# Patient Record
Sex: FEMALE | Race: WHITE | Employment: UNEMPLOYED | URBAN - METROPOLITAN AREA
[De-identification: names, ages, dates, MRNs, and addresses within clinical notes are randomized per-mention and may not be internally consistent; named-entity substitution may affect disease eponyms.]

---

## 2021-03-23 ENCOUNTER — OFFICE VISIT (OUTPATIENT)
Dept: PRIMARY CARE CLINIC | Age: 56
End: 2021-03-23
Payer: COMMERCIAL

## 2021-03-23 VITALS
BODY MASS INDEX: 22.13 KG/M2 | SYSTOLIC BLOOD PRESSURE: 130 MMHG | DIASTOLIC BLOOD PRESSURE: 76 MMHG | HEART RATE: 69 BPM | RESPIRATION RATE: 20 BRPM | HEIGHT: 68 IN | OXYGEN SATURATION: 98 % | WEIGHT: 146 LBS

## 2021-03-23 DIAGNOSIS — J45.909 MILD ASTHMA WITHOUT COMPLICATION, UNSPECIFIED WHETHER PERSISTENT: ICD-10-CM

## 2021-03-23 DIAGNOSIS — A60.00 GENITAL HERPES SIMPLEX, UNSPECIFIED SITE: ICD-10-CM

## 2021-03-23 DIAGNOSIS — M32.9 LUPUS (HCC): Primary | ICD-10-CM

## 2021-03-23 DIAGNOSIS — R10.9 ABDOMINAL PAIN, UNSPECIFIED ABDOMINAL LOCATION: ICD-10-CM

## 2021-03-23 PROCEDURE — G8427 DOCREV CUR MEDS BY ELIG CLIN: HCPCS | Performed by: FAMILY MEDICINE

## 2021-03-23 PROCEDURE — 99203 OFFICE O/P NEW LOW 30 MIN: CPT | Performed by: FAMILY MEDICINE

## 2021-03-23 PROCEDURE — G8484 FLU IMMUNIZE NO ADMIN: HCPCS | Performed by: FAMILY MEDICINE

## 2021-03-23 PROCEDURE — 1036F TOBACCO NON-USER: CPT | Performed by: FAMILY MEDICINE

## 2021-03-23 PROCEDURE — G8420 CALC BMI NORM PARAMETERS: HCPCS | Performed by: FAMILY MEDICINE

## 2021-03-23 PROCEDURE — 3017F COLORECTAL CA SCREEN DOC REV: CPT | Performed by: FAMILY MEDICINE

## 2021-03-23 RX ORDER — GLYCERIN/MIN OIL/POLYCARBOPHIL
GEL WITH APPLICATOR (GRAM) VAGINAL
COMMUNITY
End: 2022-03-22 | Stop reason: SDUPTHER

## 2021-03-23 RX ORDER — ALBUTEROL SULFATE 90 UG/1
2 AEROSOL, METERED RESPIRATORY (INHALATION) EVERY 6 HOURS PRN
Qty: 1 INHALER | Refills: 1 | Status: SHIPPED | OUTPATIENT
Start: 2021-03-23

## 2021-03-23 RX ORDER — AZATHIOPRINE 50 MG/1
TABLET ORAL
COMMUNITY
Start: 2021-03-18

## 2021-03-23 RX ORDER — HYDROXYCHLOROQUINE SULFATE 200 MG/1
200 TABLET, FILM COATED ORAL 2 TIMES DAILY
COMMUNITY
Start: 2021-03-18

## 2021-03-23 SDOH — HEALTH STABILITY: MENTAL HEALTH: HOW OFTEN DO YOU HAVE A DRINK CONTAINING ALCOHOL?: MONTHLY OR LESS

## 2021-03-23 ASSESSMENT — ENCOUNTER SYMPTOMS
SORE THROAT: 0
VOMITING: 0
CONSTIPATION: 1
COUGH: 0
BLOOD IN STOOL: 0
SHORTNESS OF BREATH: 0
DIARRHEA: 1
NAUSEA: 0
ABDOMINAL PAIN: 1
WHEEZING: 1

## 2021-03-23 ASSESSMENT — PATIENT HEALTH QUESTIONNAIRE - PHQ9
SUM OF ALL RESPONSES TO PHQ QUESTIONS 1-9: 0
SUM OF ALL RESPONSES TO PHQ QUESTIONS 1-9: 0
2. FEELING DOWN, DEPRESSED OR HOPELESS: 0
SUM OF ALL RESPONSES TO PHQ QUESTIONS 1-9: 0

## 2021-03-23 NOTE — PATIENT INSTRUCTIONS
Patient Education        Lupus: Care Instructions  Overview  Lupus (systemic lupus erythematosus) is a long-term disease that can cause inflammation, pain, and tissue damage in your body. It is an autoimmune disease. This means the immune system attacks its own tissues. Lupus may cause problems with your skin, kidneys, heart, lungs, nerves, or blood cells. There are other types of lupus, but systemic lupus erythematosus is the most common and most serious type. When you have lupus symptoms, you are having flares or relapses. When your symptoms get better, you are in remission. Lupus may get worse very quickly. There is no way to tell when a flare will happen or how bad it will be. When you have a lupus flare, you may have new symptoms as well as symptoms you have had in the past.  Learn your body's signs of a flare, such as joint pain, a rash, a fever, or being more tired. When you see any of these signs, take steps to control your symptoms. Follow-up care is a key part of your treatment and safety. Be sure to make and go to all appointments, and call your doctor if you are having problems. It's also a good idea to know your test results and keep a list of the medicines you take. How can you care for yourself at home? Reduce stress and tiredness  · Keep your daily schedule as simple as possible. · Keep your list of things to do as short as you can. · Exercise regularly. A daily walk or swim, for example, can lower stress, clear your head, improve your mood, and help fight tiredness. · Use meditation, yoga, or guided imagery to relax. · Get plenty of rest. Some people with lupus need up to 12 hours of sleep every night. · Pace yourself. Do not do too many activities. · Ask others for help. Do not try to do everything yourself. · Take short breaks from your usual activities. Think about cutting down on work hours when your symptoms are severe.   · If you think that depression or anxiety is making you feel more tired, talk to your doctor, a mental health professional, or both. Take care of your skin  · Ask your doctor about the use of corticosteroid creams for skin symptoms. · If you are bothered by the way a lupus rash looks on your face or if you have scars from lupus, you can try makeup, such as Covermark, to cover the rash or scars. · Stay out of the sun, especially when the sun's rays are the strongest, usually between 10 a.m. and 4 p.m. If you must be in the sun, cover your arms and legs, and wear a hat. Make sure to use a broad-spectrum sunscreen that has a sun protection factor (SPF) of 50 or higher. Put more sunscreen on after swimming, sweating, or toweling off. Practice good self-care  · Learn more about lupus and how to take care of yourself. · Take your medicines exactly as prescribed. Call your doctor if you have any problems with your medicine. · Do not smoke. If you need help quitting, talk to your doctor about stop-smoking programs and medicines. These can increase your chances of quitting for good. · Eat a healthy, balanced diet. A balanced diet includes whole grains, dairy, fruits and vegetables, and protein. Eat a variety of foods from each of those groups so you get all the nutrients you need. · Avoid other people who are sick with colds or the flu. These illnesses can cause lupus flares. Talk with your doctor about any vaccines you may need, including flu shots and pneumococcal vaccines. If you do get sick or think you are getting an infection, talk with your doctor so you can treat your symptoms right away. · Brush and floss your teeth each day. See your dentist two times a year. · Get regular eye exams. · Build a support system of family, friends, and health professionals. When should you call for help? Call 911 anytime you think you may need emergency care. For example, call if:    · You have symptoms of a heart attack. These may include:  ?  Chest pain or pressure, or a strange feeling in the chest.  ? Sweating. ? Shortness of breath. ? Nausea or vomiting. ? Pain, pressure, or a strange feeling in the back, neck, jaw, or upper belly or in one or both shoulders or arms. ? Lightheadedness or sudden weakness. ? A fast or irregular heartbeat. After you call 911, the  may tell you to chew 1 adult-strength or 2 to 4 low-dose aspirin. Wait for an ambulance. Do not try to drive yourself. Call your doctor now or seek immediate medical care if:    · You are short of breath.     · You have blood in your urine or are urinating less often and in smaller amounts than usual.     · You have a fever.     · You feel depressed or notice any changes in your behavior or thinking.     · You are dizzy or have muscle weakness.     · You have swelling of the lower legs or feet. Watch closely for changes in your health, and be sure to contact your doctor if:    · Your symptoms get worse or you develop any new symptoms. These may include aching or swollen joints, increased fatigue, loss of appetite, hair loss, skin rashes, or new sores in your mouth or nose. Where can you learn more? Go to https://Sovicell.Mesosphere. org and sign in to your Toutpost account. Enter S741 in the [employer] box to learn more about \"Lupus: Care Instructions. \"     If you do not have an account, please click on the \"Sign Up Now\" link. Current as of: August 5, 2020               Content Version: 12.8  © 2006-2021 Healthwise, Incorporated. Care instructions adapted under license by [facility] ([facility]). If you have questions about a medical condition or this instruction, always ask your healthcare professional. [place] [address] any warranty or liability for your use of this information.

## 2021-03-23 NOTE — PROGRESS NOTES
Chief Complaint   Patient presents with    New Patient     NP to establish         Kelly Duran is a 60 y.o. female who presents to be established. Pt recently returned to the [facility] from [facility]    Patient has a past medical history significant for lupus and is followed by Rheumatology and is on imuran and plaquenil medications since 2013 with good control. Patient had recent labwork [CMP, lipid panel, CBC, ESR, UA] 03/18/2021 which was WNL except for an elevated .3    Pt has a hx of asthma and uses albuterol inhaler as needed; Pt has never had a screening  colonoscopy    Pt does have a hx of genital herpes with flareups at times    Pt is an exsmoker Quit 2006 and drinks alcohol once a month    FHx negative for diabetes or CAD; FHx positive for Rheumatoid arthritis (MGF)        Rheumatology note 03/18/2021  Follow-up on systemic lupus [1998,thrombocytopenia, joint pain, Raynaud's, pleurisy. initially on prednisone, PlaquenilX1 yr, Imuran 2005- and Plaquenil 2013-].  Last seen 4 months ago. on a regimen of Imuran 100 mg a day, Plaquenil 400 mg daily, multivitamin. Compliant with medications. Last eye exam for Plaquenil use was in October 2020 and no abnormality noted. Denies rash or sicca symptoms or mucosal sores or chronic cough or shortness of breath or abdominal pain. Denies joint swelling or joint pain other than neck pain for about 10 days that has resolved on its own. Had Raynaud's symptoms more often this winter but no skin breakdown. Follows protective measures. Notes random occurrence of abdominal cramps and diarrhea, better with use of probiotic. Notes symptoms for 4 to 5 years now. Also reports this random occurrence of burning sensation to her skin on the extremities after taking a shower and lasting for 20 to 40 minutes.  Going on for years and cannot figure out any relationship to skin care products or foods   Diagnoses and all orders for this visit:    Other systemic lupus erythematosus times) and diarrhea (at times). Negative for blood in stool, nausea and vomiting. Negative melena or indigestion   Endocrine: Negative for polydipsia and polyuria. Genitourinary: Negative for dysuria and hematuria. Musculoskeletal: Negative for arthralgias. Skin: Negative for rash. Neurological: Negative for dizziness, seizures, syncope, speech difficulty, weakness and headaches. Psychiatric/Behavioral: Negative for dysphoric mood. The patient is not nervous/anxious. Vitals:    03/23/21 0754   BP: 130/76   Pulse: 69   Resp: 20   SpO2: 98%         Physical Exam  Vitals signs reviewed. Constitutional:       General: She is not in acute distress. HENT:      Mouth/Throat:      Mouth: Mucous membranes are moist.      Pharynx: Oropharynx is clear. Eyes:      General: No scleral icterus. Comments: Pink conjunctivae    Neck:      Thyroid: No thyromegaly. Comments: No carotid bruits noted B/L  Cardiovascular:      Heart sounds: Normal heart sounds. No murmur. No friction rub. No gallop. Pulmonary:      Effort: Pulmonary effort is normal.      Breath sounds: Normal breath sounds. Abdominal:      Palpations: Abdomen is soft. Tenderness: There is no abdominal tenderness. Musculoskeletal:      Comments: No leg edema noted B/L   Lymphadenopathy:      Cervical: No cervical adenopathy. Skin:     Findings: No rash. Neurological:      Mental Status: She is alert. Comments: Cranial nerves 2 - 12 grossly intact; Muscle strength 5/5 throughout   Psychiatric:         Mood and Affect: Mood normal.         ASSESSMENT AND PLAN    1. Lupus ([address]  Pt is being managed by Rheumatology and is clinically stable on imuran and plaquenil medications    2. Abdominal pain, unspecified abdominal location  Pt reports associated episodes of diarrhea and constipation and may be secondary to irritable bowel syndrome but will Refer pt to GI for further evaluation with a colonoscopy.   Pt is with a nontender abdomen on PE  - [facility] - Janae Perez MD, Gastroenterology, [address] [place] [address]     3. Genital herpes simplex, unspecified site  Will Refer pt to OB/Gyne for evaluation  - Damaris Gamboa MD, Gynecology, [place]    4. Mild asthma without complication, unspecified whether persistent  Patient clinically stable on present medications and is with clear lung fields on PE  - albuterol sulfate HFA (VENTOLIN HFA) 108 (90 Base) MCG/ACT inhaler; Inhale 2 puffs into the lungs every 6 hours as needed for Wheezing or Shortness of Breath  Dispense: 1 Inhaler; Refill: 1        Gabriele Chapin MD      Return in about 4 months (around 7/23/2021). Patient Instructions       Patient Education        Lupus: Care Instructions  Overview  Lupus (systemic lupus erythematosus) is a long-term disease that can cause inflammation, pain, and tissue damage in your body. It is an autoimmune disease. This means the immune system attacks its own tissues. Lupus may cause problems with your skin, kidneys, heart, lungs, nerves, or blood cells. There are other types of lupus, but systemic lupus erythematosus is the most common and most serious type. When you have lupus symptoms, you are having flares or relapses. When your symptoms get better, you are in remission. Lupus may get worse very quickly. There is no way to tell when a flare will happen or how bad it will be. When you have a lupus flare, you may have new symptoms as well as symptoms you have had in the past.  Learn your body's signs of a flare, such as joint pain, a rash, a fever, or being more tired. When you see any of these signs, take steps to control your symptoms. Follow-up care is a key part of your treatment and safety. Be sure to make and go to all appointments, and call your doctor if you are having problems. It's also a good idea to know your test results and keep a list of the medicines you take. How can you care for yourself at home?   Reduce stress and tiredness  · Keep your daily schedule as simple as possible. · Keep your list of things to do as short as you can. · Exercise regularly. A daily walk or swim, for example, can lower stress, clear your head, improve your mood, and help fight tiredness. · Use meditation, yoga, or guided imagery to relax. · Get plenty of rest. Some people with lupus need up to 12 hours of sleep every night. · Pace yourself. Do not do too many activities. · Ask others for help. Do not try to do everything yourself. · Take short breaks from your usual activities. Think about cutting down on work hours when your symptoms are severe. · If you think that depression or anxiety is making you feel more tired, talk to your doctor, a mental health professional, or both. Take care of your skin  · Ask your doctor about the use of corticosteroid creams for skin symptoms. · If you are bothered by the way a lupus rash looks on your face or if you have scars from lupus, you can try makeup, such as Covermark, to cover the rash or scars. · Stay out of the sun, especially when the sun's rays are the strongest, usually between 10 a.m. and 4 p.m. If you must be in the sun, cover your arms and legs, and wear a hat. Make sure to use a broad-spectrum sunscreen that has a sun protection factor (SPF) of 50 or higher. Put more sunscreen on after swimming, sweating, or toweling off. Practice good self-care  · Learn more about lupus and how to take care of yourself. · Take your medicines exactly as prescribed. Call your doctor if you have any problems with your medicine. · Do not smoke. If you need help quitting, talk to your doctor about stop-smoking programs and medicines. These can increase your chances of quitting for good. · Eat a healthy, balanced diet. A balanced diet includes whole grains, dairy, fruits and vegetables, and protein. Eat a variety of foods from each of those groups so you get all the nutrients you need.   · Avoid other people who are sick with colds or the flu. These illnesses can cause lupus flares. Talk with your doctor about any vaccines you may need, including flu shots and pneumococcal vaccines. If you do get sick or think you are getting an infection, talk with your doctor so you can treat your symptoms right away. · Brush and floss your teeth each day. See your dentist two times a year. · Get regular eye exams. · Build a support system of family, friends, and health professionals. When should you call for help? Call 911 anytime you think you may need emergency care. For example, call if:    · You have symptoms of a heart attack. These may include:  ? Chest pain or pressure, or a strange feeling in the chest.  ? Sweating. ? Shortness of breath. ? Nausea or vomiting. ? Pain, pressure, or a strange feeling in the back, neck, jaw, or upper belly or in one or both shoulders or arms. ? Lightheadedness or sudden weakness. ? A fast or irregular heartbeat. After you call 911, the  may tell you to chew 1 adult-strength or 2 to 4 low-dose aspirin. Wait for an ambulance. Do not try to drive yourself. Call your doctor now or seek immediate medical care if:    · You are short of breath.     · You have blood in your urine or are urinating less often and in smaller amounts than usual.     · You have a fever.     · You feel depressed or notice any changes in your behavior or thinking.     · You are dizzy or have muscle weakness.     · You have swelling of the lower legs or feet. Watch closely for changes in your health, and be sure to contact your doctor if:    · Your symptoms get worse or you develop any new symptoms. These may include aching or swollen joints, increased fatigue, loss of appetite, hair loss, skin rashes, or new sores in your mouth or nose. Where can you learn more? Go to https://benny.SoCAT. org and sign in to your TestFreakst account.  Enter L238 in the [facility] box to learn more about \"Lupus: Care Instructions. \"     If you do not have an account, please click on the \"Sign Up Now\" link. Current as of: August 5, 2020               Content Version: 12.8  © 2006-2021 Healthwise, Incorporated. Care instructions adapted under license by [facility] ([facility]). If you have questions about a medical condition or this instruction, always ask your healthcare professional. [address] any warranty or liability for your use of this information.

## 2021-05-13 ENCOUNTER — OFFICE VISIT (OUTPATIENT)
Dept: GYNECOLOGY | Age: 56
End: 2021-05-13
Payer: COMMERCIAL

## 2021-05-13 VITALS
HEIGHT: 68 IN | HEART RATE: 63 BPM | BODY MASS INDEX: 22.28 KG/M2 | OXYGEN SATURATION: 98 % | DIASTOLIC BLOOD PRESSURE: 76 MMHG | RESPIRATION RATE: 16 BRPM | SYSTOLIC BLOOD PRESSURE: 138 MMHG | WEIGHT: 147 LBS

## 2021-05-13 DIAGNOSIS — N89.8 VAGINAL DRYNESS: ICD-10-CM

## 2021-05-13 DIAGNOSIS — Z01.419 WELL WOMAN EXAM WITH ROUTINE GYNECOLOGICAL EXAM: Primary | ICD-10-CM

## 2021-05-13 DIAGNOSIS — Z12.31 ENCOUNTER FOR SCREENING MAMMOGRAM FOR MALIGNANT NEOPLASM OF BREAST: ICD-10-CM

## 2021-05-13 PROCEDURE — 99386 PREV VISIT NEW AGE 40-64: CPT | Performed by: OBSTETRICS & GYNECOLOGY

## 2021-05-13 RX ORDER — ESTRADIOL 0.1 MG/G
CREAM VAGINAL
Qty: 1 TUBE | Refills: 3 | Status: SHIPPED | OUTPATIENT
Start: 2021-05-13

## 2021-05-13 ASSESSMENT — ENCOUNTER SYMPTOMS
CONSTIPATION: 0
VOMITING: 0
ABDOMINAL DISTENTION: 0
ANAL BLEEDING: 0
SORE THROAT: 0
PHOTOPHOBIA: 0
COUGH: 0
RECTAL PAIN: 0
COLOR CHANGE: 0
BACK PAIN: 0
ABDOMINAL PAIN: 0
BLOOD IN STOOL: 0
APNEA: 0
TROUBLE SWALLOWING: 0
NAUSEA: 0
WHEEZING: 0
DIARRHEA: 0
CHEST TIGHTNESS: 0
SHORTNESS OF BREATH: 0

## 2021-05-13 NOTE — PROGRESS NOTES
Annual GYN Visit    Cinthia Ramírez  Date ofBirth:  1965    Date of Service:  5/13/2021    Chief Complaint:   Cinthia Ramírez is a 60 y.o. Werner Hernandez female who presents for routine annual gynecologic visit and vaginal dryness    HPI:  Patient is postmenopausal- LMP- over 7 years ago, she is here for routine annual exam, has not had a pap smear since 2008. Denies postmenopausal bleeding. She has severe vaginal dryness and dyspareunia secondary to dryness, patient is . Has been using otc lubricants for many years - replens     Health Maintenance   Topic Date Due    Hepatitis C screen  Never done    COVID-19 Vaccine (1) Never done    HIV screen  Never done    Cervical cancer screen  Never done    Lipid screen  Never done    Breast cancer screen  Never done    Shingles Vaccine (1 of 2) Never done    Colon cancer screen colonoscopy  Never done    DTaP/Tdap/Td vaccine (2 - Td) 01/07/2018    Flu vaccine (Season Ended) 09/01/2021    Hepatitis A vaccine  Aged Out    Hepatitis B vaccine  Aged Out    Hib vaccine  Aged Out    Meningococcal (ACWY) vaccine  Aged Out    Pneumococcal 0-64 years Vaccine  Aged Out       Past Medical History:   Diagnosis Date    Asthma     Herpes simplex type 2 infection     [address]us ([facility] [address]      Past Surgical History:   Procedure Laterality Date    KNEE ARTHROSCOPY Right 1981     OB History   No obstetric history on file.      Social History     Socioeconomic History    Marital status:      Spouse name: Not on file    Number of children: Not on file    Years of education: Not on file    Highest education level: Not on file   Occupational History    Not on file   Social Needs    Financial resource strain: Not hard at all    Food insecurity     Worry: Never true     Inability: Never true   [employer] needs     Medical: No     Non-medical: No   Tobacco Use    Smoking status: Former Smoker     Packs/day: 1.00     Years: 20.00     Pack years: 20.00     Types: Cigarettes     Quit date: 1/1/2006     Years since quitting: 15.3    Smokeless tobacco: Former User     Types: Snuff     Quit date: 1/1/2007   Substance and Sexual Activity    Alcohol use: Yes     Frequency: Monthly or less    Drug use: Never    Sexual activity: Not on file   Lifestyle    Physical activity     Days per week: Not on file     Minutes per session: Not on file    Stress: Not on file   Relationships    Social connections     Talks on phone: Not on file     Gets together: Not on file     Attends [employer] service: Not on file     Active member of club or organization: Not on file     Attends meetings of clubs or organizations: Not on file     Relationship status: Not on file    Intimate partner violence     Fear of current or ex partner: Not on file     Emotionally abused: Not on file     Physically abused: Not on file     Forced sexual activity: Not on file   Other Topics Concern    Not on file   Social History Narrative    Not on file     Allergies   Allergen Reactions    Other Itching    Sulfa Antibiotics     Penicillins Rash     Outpatient Medications Marked as Taking for the 5/13/21 encounter (Office Visit) with Karolina Walter MD   Medication Sig Dispense Refill    estradiol (ESTRACE VAGINAL) 0.1 MG/GM vaginal cream 1 gram per vagina qhs x 1 month then twice weekly 1 Tube 3    azaTHIOprine (IMURAN) 50 MG tablet 1 pill twice a day      hydroxychloroquine (PLAQUENIL) 200 MG tablet Take 200 mg by mouth 2 times daily      Probiotic Product (PROBIOTIC DAILY PO) Take by mouth      Vaginal Lubricant (REPLENS) GEL Place vaginally      albuterol sulfate HFA (VENTOLIN HFA) 108 (90 Base) MCG/ACT inhaler Inhale 2 puffs into the lungs every 6 hours as needed for Wheezing or Shortness of Breath 1 Inhaler 1     Family History   Problem Relation Age of Onset    No Known Problems Mother     Heart Disease Father         also vascular disease    Asthma Sister     Other Brother         GERD  Ovarian Cancer Paternal Aunt 61         Review of Systems:  Review of Systems   Constitutional: Negative for appetite change, chills, fatigue, fever and unexpected weight change. HENT: Negative for ear pain, hearing loss, mouth sores, sore throat and trouble swallowing. Eyes: Negative for photophobia and visual disturbance. Respiratory: Negative for apnea, cough, chest tightness, shortness of breath and wheezing. Cardiovascular: Negative for chest pain, palpitations and leg swelling. Gastrointestinal: Negative for abdominal distention, abdominal pain, anal bleeding, blood in stool, constipation, diarrhea, nausea, rectal pain and vomiting. Endocrine: Negative for cold intolerance, heat intolerance, polydipsia, polyphagia and polyuria. Genitourinary: Positive for dyspareunia (from vaginal dyrness). Negative for difficulty urinating, dysuria, enuresis, frequency, genital sores, hematuria, menstrual problem, pelvic pain, urgency, vaginal bleeding, vaginal discharge and vaginal pain. Musculoskeletal: Negative for arthralgias, back pain, joint swelling and myalgias. Skin: Negative for color change and rash. Neurological: Negative for dizziness, seizures, syncope, light-headedness and headaches. Psychiatric/Behavioral: Negative for agitation, behavioral problems, confusion, decreased concentration, dysphoric mood, hallucinations, self-injury, sleep disturbance and suicidal ideas. The patient is not nervous/anxious and is not hyperactive. Physical Exam:  /76 (Site: Right Upper Arm, Position: Sitting, Cuff Size: Medium Adult)   Pulse 63   Resp 16   Ht 5' 8\" (1.727 m)   Wt 147 lb (66.7 kg)   SpO2 98%   Breastfeeding No   BMI 22.35 kg/m²   BP Readings from Last 3 Encounters:   05/13/21 138/76   03/23/21 130/76     Body mass index is 22.35 kg/m². Physical Exam  Constitutional:       General: She is not in acute distress. Appearance: She is well-developed.    HENT:      Head: Normocephalic and atraumatic. Mouth/Throat:      Pharynx: No oropharyngeal exudate. Eyes:      General: No scleral icterus. Right eye: No discharge. Left eye: No discharge. Conjunctiva/sclera: Conjunctivae normal.   Neck:      Thyroid: No thyromegaly. Cardiovascular:      Rate and Rhythm: Normal rate and regular rhythm. Heart sounds: Normal heart sounds. Pulmonary:      Effort: Pulmonary effort is normal.      Breath sounds: Normal breath sounds. Abdominal:      General: Bowel sounds are normal. There is no distension. Palpations: Abdomen is soft. There is no mass. Tenderness: There is no abdominal tenderness. There is no guarding or rebound. Genitourinary:     Labia:         Right: No rash, tenderness, lesion or injury. Left: No rash, tenderness, lesion or injury. Vagina: Normal. No signs of injury and foreign body. No vaginal discharge, erythema or tenderness. Cervix: No cervical motion tenderness, discharge or friability. Uterus: Not deviated, not enlarged, not fixed and not tender. Adnexa:         Right: No mass, tenderness or fullness. Left: No mass, tenderness or fullness. Rectum: No mass or external hemorrhoid. Comments: Vaginal mucosa severe atrophic changes   Skin:     General: Skin is warm. Coloration: Skin is not pale. Findings: No erythema or rash. Neurological:      Mental Status: She is alert. Psychiatric:         Behavior: Behavior normal. Behavior is cooperative. Thought Content: Thought content normal.         Judgment: Judgment normal.           Assessment/Plan:  1. Well woman exam with routine gynecological exam  - PAP SMEAR  Self breast exam discussed and encouraged  Diet and exercise discussed  Discussed daily multi-vitamin and adequate calcium and vitamin D in diet    2.  Encounter for screening mammogram for malignant neoplasm of breast  - [facility] DIGITAL SCREEN W OR WO CAD

## 2021-06-18 ENCOUNTER — HOSPITAL ENCOUNTER (OUTPATIENT)
Dept: WOMENS IMAGING | Age: 56
Discharge: HOME | End: 2021-06-18
Payer: COMMERCIAL

## 2021-06-18 VITALS — WEIGHT: 144 LBS | HEIGHT: 68 IN | BODY MASS INDEX: 21.82 KG/M2

## 2021-06-18 DIAGNOSIS — Z12.31 ENCOUNTER FOR SCREENING MAMMOGRAM FOR MALIGNANT NEOPLASM OF BREAST: ICD-10-CM

## 2021-06-18 PROCEDURE — 77067 SCR MAMMO BI INCL CAD: CPT

## 2021-07-26 ENCOUNTER — OFFICE VISIT (OUTPATIENT)
Dept: PRIMARY CARE CLINIC | Age: 56
End: 2021-07-26
Payer: COMMERCIAL

## 2021-07-26 VITALS
DIASTOLIC BLOOD PRESSURE: 78 MMHG | WEIGHT: 146 LBS | HEART RATE: 74 BPM | RESPIRATION RATE: 16 BRPM | TEMPERATURE: 99.4 F | SYSTOLIC BLOOD PRESSURE: 120 MMHG | OXYGEN SATURATION: 98 % | HEIGHT: 68 IN | BODY MASS INDEX: 22.13 KG/M2

## 2021-07-26 DIAGNOSIS — M32.9 LUPUS (HCC): Primary | ICD-10-CM

## 2021-07-26 DIAGNOSIS — K58.2 IRRITABLE BOWEL SYNDROME WITH BOTH CONSTIPATION AND DIARRHEA: ICD-10-CM

## 2021-07-26 DIAGNOSIS — J45.909 MILD ASTHMA WITHOUT COMPLICATION, UNSPECIFIED WHETHER PERSISTENT: ICD-10-CM

## 2021-07-26 PROCEDURE — G8420 CALC BMI NORM PARAMETERS: HCPCS | Performed by: FAMILY MEDICINE

## 2021-07-26 PROCEDURE — G8427 DOCREV CUR MEDS BY ELIG CLIN: HCPCS | Performed by: FAMILY MEDICINE

## 2021-07-26 PROCEDURE — 3017F COLORECTAL CA SCREEN DOC REV: CPT | Performed by: FAMILY MEDICINE

## 2021-07-26 PROCEDURE — 99214 OFFICE O/P EST MOD 30 MIN: CPT | Performed by: FAMILY MEDICINE

## 2021-07-26 PROCEDURE — 1036F TOBACCO NON-USER: CPT | Performed by: FAMILY MEDICINE

## 2021-07-26 ASSESSMENT — ENCOUNTER SYMPTOMS
SHORTNESS OF BREATH: 0
NAUSEA: 0
DIARRHEA: 1
WHEEZING: 1
VOMITING: 0
CONSTIPATION: 1
COUGH: 0
ABDOMINAL PAIN: 1
BLOOD IN STOOL: 0
SORE THROAT: 0

## 2021-07-26 NOTE — PATIENT INSTRUCTIONS
Patient Education        Diet for Irritable Bowel Syndrome: Care Instructions  Your Care Instructions     Irritable bowel syndrome, or IBS, is a problem with the intestines. IBS can cause belly pain, bloating, gas, constipation, and diarrhea. Most people can control their symptoms by changing their diet and easing stress. No specific foods cause everyone with IBS to have symptoms. Many people find that they feel better by limiting or eliminating foods that may bring on symptoms. Make sure you don't stop eating all foods from any one food group without talking with a dietitian. You need to make sure you are still getting all the nutrients you need. Follow-up care is a key part of your treatment and safety. Be sure to make and go to all appointments, and call your doctor if you are having problems. It's also a good idea to know your test results and keep a list of the medicines you take. How can you care for yourself at home? To reduce constipation  · Include fruits, vegetables, beans, and whole grains in your diet each day. These foods are high in fiber. Slowly increase the amount of fiber you eat. This helps you avoid a lot of gas. · Drink plenty of fluids. If you have kidney, heart, or liver disease and have to limit fluids, talk with your doctor before you increase the amount of fluids you drink. · Get some exercise every day. Build up slowly to 30 to 60 minutes a day on 5 or more days of the week. · Take a fiber supplement, such as Citrucel or Metamucil, every day if needed. Read and follow all instructions on the label. · Schedule time each day for a bowel movement. Having a daily routine may help. Take your time and do not strain when having a bowel movement. · Check with your doctor before you increase the amount of fiber in your diet. For some people who have IBS, eating more fiber may make some symptoms worse. This includes bloating.   To reduce diarrhea  You may try giving up foods or drinks one at a time to see whether symptoms improve. Limit or avoid the following:  · Alcohol  · Caffeine, which is found in coffee, tea, cola drinks, and chocolate  · Nicotine, from smoking or chewing tobacco  · Gas-producing foods, such as beans, broccoli, cabbage, and apples  · Dairy products that contain lactose (milk sugar), such as ice cream and milk. · Foods and drinks high in sugar, especially fruit juice, soda, candy, and other packaged sweets (such as cookies)  · Foods high in fat, including villalpando, sausage, butter, oils, and anything deep-fried  · Sorbitol and xylitol, artificial sweeteners found in some sugarless candies and chewing gum  Keep track of foods  · Some people with IBS use a daily food diary to keep track of what they eat and whether they have any symptoms after eating certain foods. The diary also can be a good way to record what is going on in your life. · Stress plays a role in IBS. So if you are aware that certain stresses bring on symptoms, you can try to reduce those stresses. Keep mealtimes pleasant  · Try to maintain a pleasant environment when you eat. This may reduce stress that can make symptoms likely to occur. · Give yourself plenty of time to eat, rather than eating on the go. Chew your food slowly. Try not to swallow air, which can cause bloating. Where can you learn more? Go to https://RingadocpepicOurStory.Dropost.it. org and sign in to your Neogrowth account. Enter B029 in the [facility] box to learn more about \"Diet for Irritable Bowel Syndrome: Care Instructions. \"     If you do not have an account, please click on the \"Sign Up Now\" link. Current as of: December 17, 2020               Content Version: 12.9  © 7190-6729 Healthwise, Incorporated. Care instructions adapted under license by [facility] ([facility]).  If you have questions about a medical condition or this instruction, always ask your healthcare professional. Antoine Olivera disclaims any warranty or liability for your use of this information.

## 2021-07-26 NOTE — PROGRESS NOTES
Chief Complaint   Patient presents with    Other     follow up on chronic condition, pt has no new concerns/complaints         Bety Matute is a 60 y.o. female who presents for followup visit regarding lupus, asthma and abdominal pain  Pt did see GI as directed and reports a normal colonoscopy and was Dx with irritable bowel syndrome    Pt did see OB/Gyne as directed and had a normal PAP smear and screening mammogram      Patient has a past medical history significant for lupus and is followed by Rheumatology and is on imuran and plaquenil medications since 2013 with good control. Patient had recent labwork [CMP, lipid panel, CBC, ESR, UA] 03/18/2021 which was WNL except for an elevated . 3     Pt has a hx of asthma and uses albuterol inhaler as needed    Pt did complete her COVID vaccine series    Pt does have a hx of genital herpes with flareups at times     Pt is an exsmoker Quit 2006 and drinks alcohol once a month     FHx negative for diabetes or CAD; FHx positive for Rheumatoid arthritis (MGF)      Review of Systems   Constitutional: Positive for fatigue (mild). Negative for fever. HENT: Negative for nosebleeds and sore throat. Eyes:        Negative blurred vision or diplopia   Respiratory: Positive for wheezing (at times). Negative for cough and shortness of breath. Cardiovascular: Negative for chest pain, palpitations and leg swelling. Gastrointestinal: Positive for abdominal pain (crampy at times), constipation (at times) and diarrhea (at times). Negative for blood in stool, nausea and vomiting. Negative melena or indigestion   Endocrine: Positive for polydipsia. Negative for polyuria. Genitourinary: Negative for dysuria and hematuria. Musculoskeletal: Negative for arthralgias. Skin: Positive for rash (from lupus at times). Neurological: Negative for dizziness, seizures, syncope, speech difficulty, weakness and headaches.    Psychiatric/Behavioral: Negative for dysphoric mood. The patient is not nervous/anxious. Vitals:    07/26/21 1529   BP: 120/78   Pulse: 74   Resp: 16   Temp: 99.4 °F (37.4 °C)   SpO2: 98%         Physical Exam  Vitals reviewed. Constitutional:       General: She is not in acute distress. HENT:      Mouth/Throat:      Mouth: Mucous membranes are moist.      Pharynx: Oropharynx is clear. Eyes:      General: No scleral icterus. Comments: Pink conjunctivae    Neck:      Thyroid: No thyromegaly. Cardiovascular:      Heart sounds: Normal heart sounds. No murmur heard. No friction rub. No gallop. Pulmonary:      Effort: Pulmonary effort is normal.      Breath sounds: Normal breath sounds. Abdominal:      Palpations: Abdomen is soft. Tenderness: There is no abdominal tenderness. Musculoskeletal:      Comments: No leg edema noted B/L   Lymphadenopathy:      Cervical: No cervical adenopathy. Skin:     Findings: No rash. Neurological:      Mental Status: She is alert. Comments: Cranial nerves 2 - 12 grossly intact; Muscle strength 5/5 throughout   Psychiatric:         Mood and Affect: Mood normal.         ASSESSMENT AND PLAN    1. Lupus ([address]  Pt is managed by Rheumatology and is clinically stable on present medications    2. Mild asthma without complication, unspecified whether persistent  Patient clinically stable on present medications and is with clear lung fields on PE    3. Irritable bowel syndrome with both constipation and diarrhea  Pt is with a nontender abdomen on PE and need to obtain recent colonoscopy report for review        Mina Best MD      Return in about 6 months (around 1/26/2022). Patient Instructions     Patient Education        Diet for Irritable Bowel Syndrome: Care Instructions  Your Care Instructions     Irritable bowel syndrome, or IBS, is a problem with the intestines. IBS can cause belly pain, bloating, gas, constipation, and diarrhea.  Most people can control their symptoms by changing their diet and easing stress. No specific foods cause everyone with IBS to have symptoms. Many people find that they feel better by limiting or eliminating foods that may bring on symptoms. Make sure you don't stop eating all foods from any one food group without talking with a dietitian. You need to make sure you are still getting all the nutrients you need. Follow-up care is a key part of your treatment and safety. Be sure to make and go to all appointments, and call your doctor if you are having problems. It's also a good idea to know your test results and keep a list of the medicines you take. How can you care for yourself at home? To reduce constipation  · Include fruits, vegetables, beans, and whole grains in your diet each day. These foods are high in fiber. Slowly increase the amount of fiber you eat. This helps you avoid a lot of gas. · Drink plenty of fluids. If you have kidney, heart, or liver disease and have to limit fluids, talk with your doctor before you increase the amount of fluids you drink. · Get some exercise every day. Build up slowly to 30 to 60 minutes a day on 5 or more days of the week. · Take a fiber supplement, such as Citrucel or Metamucil, every day if needed. Read and follow all instructions on the label. · Schedule time each day for a bowel movement. Having a daily routine may help. Take your time and do not strain when having a bowel movement. · Check with your doctor before you increase the amount of fiber in your diet. For some people who have IBS, eating more fiber may make some symptoms worse. This includes bloating. To reduce diarrhea  You may try giving up foods or drinks one at a time to see whether symptoms improve.  Limit or avoid the following:  · Alcohol  · Caffeine, which is found in coffee, tea, cola drinks, and chocolate  · Nicotine, from smoking or chewing tobacco  · Gas-producing foods, such as beans, broccoli, cabbage, and apples  · Dairy products that contain lactose (milk sugar), such as ice cream and milk. · Foods and drinks high in sugar, especially fruit juice, soda, candy, and other packaged sweets (such as cookies)  · Foods high in fat, including villalpando, sausage, butter, oils, and anything deep-fried  · Sorbitol and xylitol, artificial sweeteners found in some sugarless candies and chewing gum  Keep track of foods  · Some people with IBS use a daily food diary to keep track of what they eat and whether they have any symptoms after eating certain foods. The diary also can be a good way to record what is going on in your life. · Stress plays a role in IBS. So if you are aware that certain stresses bring on symptoms, you can try to reduce those stresses. Keep mealtimes pleasant  · Try to maintain a pleasant environment when you eat. This may reduce stress that can make symptoms likely to occur. · Give yourself plenty of time to eat, rather than eating on the go. Chew your food slowly. Try not to swallow air, which can cause bloating. Where can you learn more? Go to https://InPulse Medical.Useful at Night. org and sign in to your WriteOn account. Enter F408 in the [facility] box to learn more about \"Diet for Irritable Bowel Syndrome: Care Instructions. \"     If you do not have an account, please click on the \"Sign Up Now\" link. Current as of: December 17, 2020               Content Version: 12.9  © 6782-8452 [employer], [facility]. Care instructions adapted under license by [facility] ([facility]). If you have questions about a medical condition or this instruction, always ask your healthcare professional. [place] [address] any warranty or liability for your use of this information.

## 2022-03-22 DIAGNOSIS — N89.8 DRYNESS OF VAGINA: Primary | ICD-10-CM

## 2022-03-22 RX ORDER — GLYCERIN/MIN OIL/POLYCARBOPHIL
0.1 GEL WITH APPLICATOR (GRAM) VAGINAL DAILY
Qty: 6.7 G | Refills: 0 | Status: SHIPPED | OUTPATIENT
Start: 2022-03-22

## 2022-03-22 NOTE — TELEPHONE ENCOUNTER
Medication:   Requested Prescriptions     Pending Prescriptions Disp Refills    Vaginal Lubricant (REPLENS) GEL  0     Sig: Place 0.1 mg vaginally        Last Filled:      Patient Phone Number: [phone] (home)     Last appt: 7/26/2021   Next appt: 4/7/2022    Last OARRS: No flowsheet data found.

## 2022-03-22 NOTE — TELEPHONE ENCOUNTER
----- Message from José Miguel Rolon sent at 3/22/2022 11:08 AM EDT -----  Subject: Refill Request    QUESTIONS  Name of Medication? estradiol (ESTRACE VAGINAL) 0.1 MG/GM vaginal cream  Patient-reported dosage and instructions? Applied 1 Gram of the cream   twice completely  How many days do you have left? 0  Preferred Pharmacy? [address] phone number (if available)? [phone]  Additional Information for Provider? Pt was seeing a [facility] ob/gyn   that prescribes this name listed in the chart. She has refills but that   provider isn't with us anymore. Can you sign off on this refill while she   awaits being referred to another ob/gyn on her next appt 4/7/22?  ---------------------------------------------------------------------------  --------------  [address]  What is the best way for the office to contact you? OK to leave message on   voicemail  Preferred Call Back Phone Number?  [phone]

## 2022-04-01 NOTE — TELEPHONE ENCOUNTER
Left detailed voice mail for patient stating that Dr Kelsey Enicnas sent RX on 3/22/2022 to [facility]. No further action is required for this encounter.

## 2022-04-01 NOTE — TELEPHONE ENCOUNTER
Patient requesting a medication refill.     Medication Estradiol  Dosage 0.1MG/GM  Frequency 1 gram per vagina qhs x 1 month then twice weekly  [address] [facility] [address]   Next office visit 4/7/2022

## 2022-04-07 ENCOUNTER — OFFICE VISIT (OUTPATIENT)
Dept: PRIMARY CARE CLINIC | Age: 57
End: 2022-04-07
Payer: COMMERCIAL

## 2022-04-07 VITALS
TEMPERATURE: 97.8 F | HEART RATE: 63 BPM | BODY MASS INDEX: 23.04 KG/M2 | HEIGHT: 68 IN | OXYGEN SATURATION: 99 % | WEIGHT: 152 LBS | DIASTOLIC BLOOD PRESSURE: 80 MMHG | RESPIRATION RATE: 20 BRPM | SYSTOLIC BLOOD PRESSURE: 124 MMHG

## 2022-04-07 DIAGNOSIS — N89.8 DRYNESS OF VAGINA: ICD-10-CM

## 2022-04-07 DIAGNOSIS — K58.2 IRRITABLE BOWEL SYNDROME WITH BOTH CONSTIPATION AND DIARRHEA: ICD-10-CM

## 2022-04-07 DIAGNOSIS — Z12.83 SKIN CANCER SCREENING: ICD-10-CM

## 2022-04-07 DIAGNOSIS — M32.9 LUPUS (HCC): Primary | ICD-10-CM

## 2022-04-07 DIAGNOSIS — J45.909 MILD ASTHMA WITHOUT COMPLICATION, UNSPECIFIED WHETHER PERSISTENT: ICD-10-CM

## 2022-04-07 PROCEDURE — G8420 CALC BMI NORM PARAMETERS: HCPCS | Performed by: FAMILY MEDICINE

## 2022-04-07 PROCEDURE — 3017F COLORECTAL CA SCREEN DOC REV: CPT | Performed by: FAMILY MEDICINE

## 2022-04-07 PROCEDURE — G8427 DOCREV CUR MEDS BY ELIG CLIN: HCPCS | Performed by: FAMILY MEDICINE

## 2022-04-07 PROCEDURE — 99214 OFFICE O/P EST MOD 30 MIN: CPT | Performed by: FAMILY MEDICINE

## 2022-04-07 PROCEDURE — 1036F TOBACCO NON-USER: CPT | Performed by: FAMILY MEDICINE

## 2022-04-07 SDOH — ECONOMIC STABILITY: FOOD INSECURITY: WITHIN THE PAST 12 MONTHS, THE FOOD YOU BOUGHT JUST DIDN'T LAST AND YOU DIDN'T HAVE MONEY TO GET MORE.: NEVER TRUE

## 2022-04-07 SDOH — ECONOMIC STABILITY: FOOD INSECURITY: WITHIN THE PAST 12 MONTHS, YOU WORRIED THAT YOUR FOOD WOULD RUN OUT BEFORE YOU GOT MONEY TO BUY MORE.: NEVER TRUE

## 2022-04-07 ASSESSMENT — PATIENT HEALTH QUESTIONNAIRE - PHQ9
2. FEELING DOWN, DEPRESSED OR HOPELESS: 0
SUM OF ALL RESPONSES TO PHQ QUESTIONS 1-9: 0
SUM OF ALL RESPONSES TO PHQ9 QUESTIONS 1 & 2: 0
1. LITTLE INTEREST OR PLEASURE IN DOING THINGS: 0
SUM OF ALL RESPONSES TO PHQ QUESTIONS 1-9: 0

## 2022-04-07 ASSESSMENT — ENCOUNTER SYMPTOMS
COUGH: 0
VOMITING: 0
WHEEZING: 0
SHORTNESS OF BREATH: 0
CONSTIPATION: 0
DIARRHEA: 0
SORE THROAT: 0
BLOOD IN STOOL: 0
NAUSEA: 0
ABDOMINAL PAIN: 0

## 2022-04-07 ASSESSMENT — SOCIAL DETERMINANTS OF HEALTH (SDOH): HOW HARD IS IT FOR YOU TO PAY FOR THE VERY BASICS LIKE FOOD, HOUSING, MEDICAL CARE, AND HEATING?: NOT HARD AT ALL

## 2022-04-07 NOTE — PATIENT INSTRUCTIONS
Patient Education        Lupus: Care Instructions  Overview  Lupus (systemic lupus erythematosus) is a long-term disease that can cause inflammation, pain, and tissue damage in your body. It is an autoimmune disease. This means the immune system attacks its own tissues. Lupus may cause problems with your skin, kidneys, heart, lungs, nerves, or blood cells. There are other types of lupus, but systemic lupus erythematosus is the most commonand most serious type. When you have lupus symptoms, you are having flares or relapses. When your symptoms get better, you are in remission. Lupus may get worse very quickly. There is no way to tell when a flare will happen or how bad it will be. When you have a lupus flare, you may have new symptoms as well as symptoms you havehad in the past.  Learn your body's signs of a flare, such as joint pain, a rash, a fever, or being more tired. When you see any of these signs, take steps to control yoursymptoms. Follow-up care is a key part of your treatment and safety. Be sure to make and go to all appointments, and call your doctor if you are having problems. It's also a good idea to know your test results and keep alist of the medicines you take. How can you care for yourself at home? Reduce stress and tiredness   Keep your daily schedule as simple as possible.  Keep your list of things to do as short as you can.  Exercise regularly. A daily walk or swim, for example, can lower stress, clear your head, improve your mood, and help fight tiredness.  Use meditation, yoga, or guided imagery to relax.  Get plenty of rest. Some people with lupus need up to 12 hours of sleep every night.  Pace yourself. Do not do too many activities.  Ask others for help. Do not try to do everything yourself.  Take short breaks from your usual activities. Think about cutting down on work hours when your symptoms are severe.    If you think that depression or anxiety is making you feel more tired, talk to your doctor, a mental health professional, or both. Take care of your skin   Ask your doctor about the use of corticosteroid creams for skin symptoms.  If you are bothered by the way a lupus rash looks on your face or if you have scars from lupus, you can try makeup, such as Covermark, to cover the rash or scars.  Stay out of the sun, especially when the sun's rays are the strongest, usually between 10 a.m. and 4 p.m. If you must be in the sun, cover your arms and legs, and wear a hat. Make sure to use a broad-spectrum sunscreen that has a sun protection factor (SPF) of 50 or higher. Put more sunscreen on after swimming, sweating, or toweling off. Practice good self-care   Learn more about lupus and how to take care of yourself.  Take your medicines exactly as prescribed. Call your doctor if you have any problems with your medicine.  Do not smoke. If you need help quitting, talk to your doctor about stop-smoking programs and medicines. These can increase your chances of quitting for good.  Eat a healthy, balanced diet. A balanced diet includes whole grains, dairy, fruits and vegetables, and protein. Eat a variety of foods from each of those groups so you get all the nutrients you need.  Avoid other people who are sick with colds or the flu. These illnesses can cause lupus flares. Talk with your doctor about any vaccines you may need, including flu shots and pneumococcal vaccines. If you do get sick or think you are getting an infection, talk with your doctor so you can treat your symptoms right away.  Brush and floss your teeth each day. See your dentist two times a year.  Get regular eye exams.  Build a support system of family, friends, and health professionals. When should you call for help? Call 911 anytime you think you may need emergency care. For example, call if:     You have symptoms of a heart attack. These may include:  ?  Chest pain or pressure, or a strange feeling in the chest.  ? Sweating. ? Shortness of breath. ? Nausea or vomiting. ? Pain, pressure, or a strange feeling in the back, neck, jaw, or upper belly or in one or both shoulders or arms. ? Lightheadedness or sudden weakness. ? A fast or irregular heartbeat. After you call 911, the  may tell you to chew 1 adult-strength or 2 to 4 low-doseaspirin. Wait for an ambulance. Do not try to drive yourself. Call your doctor now or seek immediate medical care if:     You are short of breath.      You have blood in your urine or are urinating less often and in smaller amounts than usual.      You have a fever.      You feel depressed or notice any changes in your behavior or thinking.      You are dizzy or have muscle weakness.      You have swelling of the lower legs or feet. Watch closely for changes in your health, and be sure to contact your doctor if:     Your symptoms get worse or you develop any new symptoms. These may include aching or swollen joints, increased fatigue, loss of appetite, hair loss, skin rashes, or new sores in your mouth or nose. Where can you learn more? Go to https://eyetok.Nanushka. org and sign in to your Seer account. Enter J713 in the [employer] box to learn more about \"Lupus: Care Instructions. \"     If you do not have an account, please click on the \"Sign Up Now\" link. Current as of: December 20, 2021               Content Version: 13.2  © 2006-2022 Healthwise, Incorporated. Care instructions adapted under license by [facility] ([facility]). If you have questions about a medical condition or this instruction, always ask your healthcare professional. [place] [address] any warranty or liability for your use of this information.

## 2022-04-07 NOTE — PROGRESS NOTES
Chief Complaint   Patient presents with    Follow-up     pt here for follow up visit for chronic condition         Stevan Mejia is a 60 y.o. female who presents for routine visit regarding lupus, asthma and irritable bowel syndrome  Pt had a normal colonoscopy last year and is on a daily fiber supplement     Pt uses estrogen cream for vaginal dryness and had a normal PAP smear and screening mammogram last year     Patient has a past medical history significant for lupus and is followed by Rheumatology and is on imuran and plaquenil medications since 2013 with good control. Pt reports a normal eye exam 10/2021 but her plaquenil dosage was decreased at that time to be safe and pt has had only one flareup 2 weeks ago in which she had acute L wrist pain. Patient had CBC, CMP and ESR bloodwork yesterday which was WNL     Pt has a hx of asthma and uses albuterol inhaler as needed     Pt did complete her COVID vaccine series     Pt does have a hx of genital herpes with flareups at times     Pt is an exsmoker Quit 2006 and drinks alcohol once a month     FHx negative for diabetes or CAD; FHx positive for Rheumatoid arthritis (MGF)      Rheumatology note 04/06/2022  Follow-up on systemic lupus [1998,thrombocytopenia, joint pain, Raynaud's, pleurisy. initially on prednisone, PlaquenilX1 yr, Imuran 2005- and Plaquenil 2013-].  on a regimen of Imuran 100 mg a day, Plaquenil 300 mg daily [dose decreased on last visit to keep it below 5 mg/kg] multivitamin. Last seen 4 months ago.   History-  She is doing well and has not had any significant issues with the decrease in hydroxychloroquine dose. She had one episode of severe pain at the left wrist and difficulty using her left hand that lasted 2 days. The intensity of pain decreased overnight but symptoms lingered for 2 days. She also felt some tightness in her chest at that time like her pleurisy was flaring but it resolved overnight.   She is frequently having excessive tearing from both eyes and then gritty feeling in the eyes. Her right seems to be worse. No dryness to mouth. No fevers or rash or cough or shortness of breath or abdominal pain. No joint symptoms   Other systemic lupus erythematosus with other organ involvement ([address]  Stable other than the sign of dry eyes. Blood work to monitor. Asked her to continue current dose of hydroxychloroquine and azathioprine  - CBC WITH DIFFERENTIAL; Future  - CMP (BAMP,TP,ALB,TBIL,ALK,AST,ALT); Future  - COMPLEMENT C4; Future  - COMPLEMENT C3; Future  - VENIPUNC,VENOUS BLOOD  - SED RATE; Future    Excessive tearing, bilateral  precursor of dry eyes. Advised her to use artificial tears twice a day every day. Vaccine counseling  Given immunocompromise from medication, advised a second Covid vaccine booster if it has been 3 months since the first booster    Other orders  - hydroxychloroquine (PLAQUENIL) 200 mg tablet; 1 pill in morning and 1/2 pill in evening every day  - azaTHIOprine (IMURAN) 50 MG TABS; Take 1 tablet by mouth 2 (two) times daily. Recommended a follow-up in 4 months. Review of Systems   Constitutional: Positive for fatigue (mild). Negative for fever. HENT: Negative for nosebleeds and sore throat. Eyes:        Negative blurred vision or diplopia   Respiratory: Negative for cough, shortness of breath and wheezing. Cardiovascular: Negative for chest pain, palpitations and leg swelling. Gastrointestinal: Negative for abdominal pain, blood in stool, constipation, diarrhea, nausea and vomiting. Negative melena or indigestion   Endocrine: Negative for polydipsia and polyuria. Genitourinary: Negative for dysuria and hematuria. Musculoskeletal: Positive for arthralgias (L wrist 2 weeks ago). Skin: Positive for rash (from lupus at times). Neurological: Positive for headaches (at times). Negative for dizziness, seizures, syncope, speech difficulty and weakness.    Psychiatric/Behavioral: Negative for dysphoric mood. The patient is not nervous/anxious. Vitals:    04/07/22 0904   BP: 124/80   Pulse: 63   Resp: 20   Temp: 97.8 °F (36.6 °C)   SpO2: 99%         Physical Exam  Vitals reviewed. Constitutional:       General: She is not in acute distress. HENT:      Mouth/Throat:      Mouth: Mucous membranes are moist.      Pharynx: Oropharynx is clear. Eyes:      General: No scleral icterus. Comments: Pink conjunctivae    Neck:      Thyroid: No thyromegaly. Cardiovascular:      Heart sounds: Normal heart sounds. No murmur heard. No friction rub. No gallop. Pulmonary:      Effort: Pulmonary effort is normal.      Breath sounds: Normal breath sounds. Abdominal:      Palpations: Abdomen is soft. Tenderness: There is no abdominal tenderness. Musculoskeletal:      Comments: No leg edema noted B/L   Lymphadenopathy:      Cervical: No cervical adenopathy. Skin:     Findings: No rash. Comments: Positive fair complexion with freckling    Neurological:      Mental Status: She is alert. Comments: Cranial nerves 2 - 12 grossly intact; Muscle strength 5/5 throughout   Psychiatric:         Mood and Affect: Mood normal.         ASSESSMENT AND PLAN    1. Mild asthma without complication, unspecified whether persistent  Patient clinically stable on present medications and is with clear lung fields on PE    2. Lupus ([address]  Pt is managed by Rheumatology and is clinically stable on present medications and had recent normal CMP and CBC bloodwork    3. Irritable bowel syndrome with both constipation and diarrhea  Pt clinically stable on a daily fiber supplement and is with a nontender abdomen on PE    4. Dryness of vagina  Will Refer patient to OB/Gyne for continued management  - [facility] - Arie Wagner MD, Gynecology, [facility]    5.  Skin cancer screening  - Shawn Spears MD, Dermatology, [facility] MD [facility]      Return in about 6 months (around 10/7/2022). Patient Instructions       Patient Education        Lupus: Care Instructions  Overview  Lupus (systemic lupus erythematosus) is a long-term disease that can cause inflammation, pain, and tissue damage in your body. It is an autoimmune disease. This means the immune system attacks its own tissues. Lupus may cause problems with your skin, kidneys, heart, lungs, nerves, or blood cells. There are other types of lupus, but systemic lupus erythematosus is the most commonand most serious type. When you have lupus symptoms, you are having flares or relapses. When your symptoms get better, you are in remission. Lupus may get worse very quickly. There is no way to tell when a flare will happen or how bad it will be. When you have a lupus flare, you may have new symptoms as well as symptoms you havehad in the past.  Learn your body's signs of a flare, such as joint pain, a rash, a fever, or being more tired. When you see any of these signs, take steps to control yoursymptoms. Follow-up care is a key part of your treatment and safety. Be sure to make and go to all appointments, and call your doctor if you are having problems. It's also a good idea to know your test results and keep alist of the medicines you take. How can you care for yourself at home? Reduce stress and tiredness   Keep your daily schedule as simple as possible.  Keep your list of things to do as short as you can.  Exercise regularly. A daily walk or swim, for example, can lower stress, clear your head, improve your mood, and help fight tiredness.  Use meditation, yoga, or guided imagery to relax.  Get plenty of rest. Some people with lupus need up to 12 hours of sleep every night.  Pace yourself. Do not do too many activities.  Ask others for help. Do not try to do everything yourself.  Take short breaks from your usual activities. Think about cutting down on work hours when your symptoms are severe.    If you think that depression or anxiety is making you feel more tired, talk to your doctor, a mental health professional, or both. Take care of your skin   Ask your doctor about the use of corticosteroid creams for skin symptoms.  If you are bothered by the way a lupus rash looks on your face or if you have scars from lupus, you can try makeup, such as Covermark, to cover the rash or scars.  Stay out of the sun, especially when the sun's rays are the strongest, usually between 10 a.m. and 4 p.m. If you must be in the sun, cover your arms and legs, and wear a hat. Make sure to use a broad-spectrum sunscreen that has a sun protection factor (SPF) of 50 or higher. Put more sunscreen on after swimming, sweating, or toweling off. Practice good self-care   Learn more about lupus and how to take care of yourself.  Take your medicines exactly as prescribed. Call your doctor if you have any problems with your medicine.  Do not smoke. If you need help quitting, talk to your doctor about stop-smoking programs and medicines. These can increase your chances of quitting for good.  Eat a healthy, balanced diet. A balanced diet includes whole grains, dairy, fruits and vegetables, and protein. Eat a variety of foods from each of those groups so you get all the nutrients you need.  Avoid other people who are sick with colds or the flu. These illnesses can cause lupus flares. Talk with your doctor about any vaccines you may need, including flu shots and pneumococcal vaccines. If you do get sick or think you are getting an infection, talk with your doctor so you can treat your symptoms right away.  Brush and floss your teeth each day. See your dentist two times a year.  Get regular eye exams.  Build a support system of family, friends, and health professionals. When should you call for help? Call 911 anytime you think you may need emergency care. For example, call if:     You have symptoms of a heart attack.  These may include:  ? Chest pain or pressure, or a strange feeling in the chest.  ? Sweating. ? Shortness of breath. ? Nausea or vomiting. ? Pain, pressure, or a strange feeling in the back, neck, jaw, or upper belly or in one or both shoulders or arms. ? Lightheadedness or sudden weakness. ? A fast or irregular heartbeat. After you call 911, the  may tell you to chew 1 adult-strength or 2 to 4 low-doseaspirin. Wait for an ambulance. Do not try to drive yourself. Call your doctor now or seek immediate medical care if:     You are short of breath.      You have blood in your urine or are urinating less often and in smaller amounts than usual.      You have a fever.      You feel depressed or notice any changes in your behavior or thinking.      You are dizzy or have muscle weakness.      You have swelling of the lower legs or feet. Watch closely for changes in your health, and be sure to contact your doctor if:     Your symptoms get worse or you develop any new symptoms. These may include aching or swollen joints, increased fatigue, loss of appetite, hair loss, skin rashes, or new sores in your mouth or nose. Where can you learn more? Go to https://[employer]. org and sign in to your [employer] account. Enter N465 in the [employer] box to learn more about \"Lupus: Care Instructions. \"     If you do not have an account, please click on the \"Sign Up Now\" link. Current as of: December 20, 2021               Content Version: 13.2  © 2006-2022 [employer]wise, Incorporated. Care instructions adapted under license by [facility] ([facility]). If you have questions about a medical condition or this instruction, always ask your healthcare professional. [place] [address] any warranty or liability for your use of this information.

## 2022-07-15 ENCOUNTER — HOSPITAL ENCOUNTER (OUTPATIENT)
Dept: WOMENS IMAGING | Age: 57
Discharge: HOME | End: 2022-07-15
Payer: COMMERCIAL

## 2022-07-15 DIAGNOSIS — Z12.31 VISIT FOR SCREENING MAMMOGRAM: ICD-10-CM

## 2022-07-15 PROCEDURE — 77067 SCR MAMMO BI INCL CAD: CPT

## 2022-10-06 ENCOUNTER — APPOINTMENT (RX ONLY)
Dept: URBAN - METROPOLITAN AREA CLINIC 170 | Facility: CLINIC | Age: 57
Setting detail: DERMATOLOGY
End: 2022-10-06

## 2022-10-06 DIAGNOSIS — D22 MELANOCYTIC NEVI: ICD-10-CM | Status: STABLE

## 2022-10-06 DIAGNOSIS — L81.4 OTHER MELANIN HYPERPIGMENTATION: ICD-10-CM | Status: STABLE

## 2022-10-06 DIAGNOSIS — L85.3 XEROSIS CUTIS: ICD-10-CM | Status: STABLE

## 2022-10-06 DIAGNOSIS — L82.1 OTHER SEBORRHEIC KERATOSIS: ICD-10-CM | Status: STABLE

## 2022-10-06 DIAGNOSIS — D18.0 HEMANGIOMA: ICD-10-CM | Status: STABLE

## 2022-10-06 PROBLEM — D18.01 HEMANGIOMA OF SKIN AND SUBCUTANEOUS TISSUE: Status: ACTIVE | Noted: 2022-10-06

## 2022-10-06 PROBLEM — D22.5 MELANOCYTIC NEVI OF TRUNK: Status: ACTIVE | Noted: 2022-10-06

## 2022-10-06 PROCEDURE — ? FULL BODY SKIN EXAM

## 2022-10-06 PROCEDURE — 99203 OFFICE O/P NEW LOW 30 MIN: CPT

## 2022-10-06 PROCEDURE — ? ADDITIONAL NOTES

## 2022-10-06 PROCEDURE — ? COUNSELING

## 2022-10-06 PROCEDURE — ? TREATMENT REGIMEN

## 2022-10-06 ASSESSMENT — LOCATION DETAILED DESCRIPTION DERM
LOCATION DETAILED: LEFT SUPERIOR MEDIAL MIDBACK
LOCATION DETAILED: PERIUMBILICAL SKIN
LOCATION DETAILED: LEFT ANTERIOR DISTAL THIGH
LOCATION DETAILED: EPIGASTRIC SKIN
LOCATION DETAILED: RIGHT DISTAL POSTERIOR UPPER ARM
LOCATION DETAILED: LEFT PROXIMAL POSTERIOR UPPER ARM
LOCATION DETAILED: RIGHT MID-UPPER BACK

## 2022-10-06 ASSESSMENT — LOCATION ZONE DERM
LOCATION ZONE: ARM
LOCATION ZONE: LEG
LOCATION ZONE: TRUNK

## 2022-10-06 ASSESSMENT — LOCATION SIMPLE DESCRIPTION DERM
LOCATION SIMPLE: LEFT THIGH
LOCATION SIMPLE: ABDOMEN
LOCATION SIMPLE: LEFT POSTERIOR UPPER ARM
LOCATION SIMPLE: RIGHT UPPER BACK
LOCATION SIMPLE: LEFT LOWER BACK
LOCATION SIMPLE: RIGHT POSTERIOR UPPER ARM

## 2022-10-06 NOTE — PROCEDURE: ADDITIONAL NOTES
Detail Level: Simple
Additional Notes: Patient consent was obtained to proceed with the visit and recommended plan of care after discussion of all risks and benefits, including the risks of COVID-19 exposure.
Render Risk Assessment In Note?: yes
Additional Notes: Samples of eucerin
Detail Level: Detailed
Render Risk Assessment In Note?: no

## 2023-10-27 ENCOUNTER — HOSPITAL ENCOUNTER (OUTPATIENT)
Dept: WOMENS IMAGING | Age: 58
Discharge: HOME | End: 2023-10-27
Payer: MEDICARE

## 2023-10-27 VITALS — HEIGHT: 68 IN | WEIGHT: 147 LBS | BODY MASS INDEX: 22.28 KG/M2

## 2023-10-27 DIAGNOSIS — Z12.31 VISIT FOR SCREENING MAMMOGRAM: ICD-10-CM

## 2023-10-27 PROCEDURE — 77067 SCR MAMMO BI INCL CAD: CPT

## 2024-08-21 ENCOUNTER — OFFICE VISIT (OUTPATIENT)
Dept: PRIMARY CARE CLINIC | Age: 59
End: 2024-08-21
Payer: COMMERCIAL

## 2024-08-21 VITALS
TEMPERATURE: 97.9 F | SYSTOLIC BLOOD PRESSURE: 110 MMHG | HEART RATE: 55 BPM | DIASTOLIC BLOOD PRESSURE: 70 MMHG | HEIGHT: 67 IN | BODY MASS INDEX: 22.6 KG/M2 | OXYGEN SATURATION: 97 % | WEIGHT: 144 LBS

## 2024-08-21 DIAGNOSIS — M32.9 LUPUS (HCC): ICD-10-CM

## 2024-08-21 DIAGNOSIS — J45.20 MILD INTERMITTENT ASTHMA WITHOUT COMPLICATION: ICD-10-CM

## 2024-08-21 DIAGNOSIS — Z00.00 INITIAL MEDICARE ANNUAL WELLNESS VISIT: Primary | ICD-10-CM

## 2024-08-21 PROCEDURE — G0438 PPPS, INITIAL VISIT: HCPCS | Performed by: FAMILY MEDICINE

## 2024-08-21 SDOH — ECONOMIC STABILITY: FOOD INSECURITY: WITHIN THE PAST 12 MONTHS, YOU WORRIED THAT YOUR FOOD WOULD RUN OUT BEFORE YOU GOT MONEY TO BUY MORE.: NEVER TRUE

## 2024-08-21 SDOH — ECONOMIC STABILITY: FOOD INSECURITY: WITHIN THE PAST 12 MONTHS, THE FOOD YOU BOUGHT JUST DIDN'T LAST AND YOU DIDN'T HAVE MONEY TO GET MORE.: NEVER TRUE

## 2024-08-21 SDOH — ECONOMIC STABILITY: INCOME INSECURITY: HOW HARD IS IT FOR YOU TO PAY FOR THE VERY BASICS LIKE FOOD, HOUSING, MEDICAL CARE, AND HEATING?: NOT HARD AT ALL

## 2024-08-21 ASSESSMENT — PATIENT HEALTH QUESTIONNAIRE - PHQ9
SUM OF ALL RESPONSES TO PHQ QUESTIONS 1-9: 0
SUM OF ALL RESPONSES TO PHQ9 QUESTIONS 1 & 2: 0
SUM OF ALL RESPONSES TO PHQ QUESTIONS 1-9: 0
1. LITTLE INTEREST OR PLEASURE IN DOING THINGS: NOT AT ALL
SUM OF ALL RESPONSES TO PHQ QUESTIONS 1-9: 0
2. FEELING DOWN, DEPRESSED OR HOPELESS: NOT AT ALL
SUM OF ALL RESPONSES TO PHQ QUESTIONS 1-9: 0

## 2024-08-21 NOTE — PROGRESS NOTES
Medicare Annual Wellness Visit  Name: Eva Slade Today’s Date: 2024   MRN: [id number] Sex: Female   Age: 59 y.o. Ethnicity: Non- / Non    : 1965 Race: White (non-)      Eva Slade is here for No chief complaint on file.    Pt had a normal colonoscopy 3 years ago and is on a daily fiber supplement     Pt uses estrogen cream for vaginal dryness and had a normal PAP smear and screening mammogram last year     Pt reports a normal skin cancer screening by Dermatology a couple years ago     Patient has a past medical history significant for lupus and is followed by Rheumatology and is on imuran and plaquenil medications since  with good control but did have her plaquenil discontinued 2 months ago secondary to SE aquagenic itch. Patient had recent normal CBC and CMP bloodwork     Pt has a hx of asthma and uses albuterol inhaler as needed     Pt did complete her COVID vaccine series     Pt does have a hx of genital herpes with flareups at times     Pt is an exsmoker Quit  and drinks alcohol once a month     FHx negative for diabetes or CAD; FHx positive for Rheumatoid arthritis (MGF)      Screenings for behavioral, psychosocial and functional/safety risks, and cognitive dysfunction are all negative except as indicated below. These results, as well as other patient data from the Health Risk Assessment form, are documented in Flowsheets linked to this Encounter.    Allergies   Allergen Reactions    Other Itching    Sulfa Antibiotics     Penicillins Rash       Prior to Visit Medications    Medication Sig Taking? Authorizing Provider   estradiol (ESTRACE VAGINAL) 0.1 MG/GM vaginal cream 1 gram per vagina qhs x 1 month then twice weekly Yes Danii Mendieta MD   azaTHIOprine (IMURAN) 50 MG tablet 1 pill twice a day Yes Provider, MD Ric   Vaginal Lubricant (REPLENS) GEL Place 0.1 mg vaginally daily  Patient not taking: Reported on 2022  Carmine Lee MD

## 2024-08-30 DIAGNOSIS — M32.9 LUPUS (HCC): ICD-10-CM

## 2024-08-30 LAB
CHOLEST SERPL-MCNC: 153 MG/DL (ref 0–199)
EST. AVERAGE GLUCOSE BLD GHB EST-MCNC: 102.5 MG/DL
HBA1C MFR BLD: 5.2 %
HDLC SERPL-MCNC: 45 MG/DL (ref 40–60)
LDLC SERPL CALC-MCNC: 96 MG/DL
TRIGL SERPL-MCNC: 59 MG/DL (ref 0–150)
TSH SERPL DL<=0.005 MIU/L-ACNC: 2.46 UIU/ML (ref 0.27–4.2)
VLDLC SERPL CALC-MCNC: 12 MG/DL